# Patient Record
Sex: FEMALE | Race: WHITE | NOT HISPANIC OR LATINO | Employment: OTHER | URBAN - METROPOLITAN AREA
[De-identification: names, ages, dates, MRNs, and addresses within clinical notes are randomized per-mention and may not be internally consistent; named-entity substitution may affect disease eponyms.]

---

## 2017-01-21 ENCOUNTER — HOSPITAL ENCOUNTER (OUTPATIENT)
Dept: LAB | Facility: MEDICAL CENTER | Age: 72
End: 2017-01-21
Attending: INTERNAL MEDICINE
Payer: MEDICARE

## 2017-01-21 ENCOUNTER — HOSPITAL ENCOUNTER (OUTPATIENT)
Dept: LAB | Facility: MEDICAL CENTER | Age: 72
End: 2017-01-21
Attending: CLINICAL NURSE SPECIALIST
Payer: MEDICARE

## 2017-01-21 DIAGNOSIS — D05.91 CARCINOMA IN SITU OF RIGHT BREAST: ICD-10-CM

## 2017-01-21 LAB
25(OH)D3 SERPL-MCNC: 13 NG/ML (ref 30–100)
ALBUMIN SERPL BCP-MCNC: 4.3 G/DL (ref 3.2–4.9)
ALBUMIN/GLOB SERPL: 1.6 G/DL
ALP SERPL-CCNC: 56 U/L (ref 30–99)
ALT SERPL-CCNC: 14 U/L (ref 2–50)
ANION GAP SERPL CALC-SCNC: 11 MMOL/L (ref 0–11.9)
AST SERPL-CCNC: 17 U/L (ref 12–45)
BASOPHILS # BLD AUTO: 0.03 K/UL (ref 0–0.12)
BASOPHILS NFR BLD AUTO: 0.5 % (ref 0–1.8)
BILIRUB SERPL-MCNC: 0.5 MG/DL (ref 0.1–1.5)
BUN SERPL-MCNC: 27 MG/DL (ref 8–22)
CALCIUM SERPL-MCNC: 9.2 MG/DL (ref 8.5–10.5)
CHLORIDE SERPL-SCNC: 101 MMOL/L (ref 96–112)
CO2 SERPL-SCNC: 25 MMOL/L (ref 20–33)
CREAT SERPL-MCNC: 1.35 MG/DL (ref 0.5–1.4)
EOSINOPHIL # BLD: 0.07 K/UL (ref 0–0.51)
EOSINOPHIL NFR BLD AUTO: 1.2 % (ref 0–6.9)
ERYTHROCYTE [DISTWIDTH] IN BLOOD BY AUTOMATED COUNT: 42.5 FL (ref 35.9–50)
GLOBULIN SER CALC-MCNC: 2.7 G/DL (ref 1.9–3.5)
GLUCOSE SERPL-MCNC: 104 MG/DL (ref 65–99)
HCT VFR BLD AUTO: 40.7 % (ref 37–47)
HGB BLD-MCNC: 13.2 G/DL (ref 12–16)
IMM GRANULOCYTES # BLD AUTO: 0.01 K/UL (ref 0–0.11)
IMM GRANULOCYTES NFR BLD AUTO: 0.2 % (ref 0–0.9)
LYMPHOCYTES # BLD: 1.63 K/UL (ref 1–4.8)
LYMPHOCYTES NFR BLD AUTO: 26.8 % (ref 22–41)
MCH RBC QN AUTO: 29.3 PG (ref 27–33)
MCHC RBC AUTO-ENTMCNC: 32.4 G/DL (ref 33.6–35)
MCV RBC AUTO: 90.2 FL (ref 81.4–97.8)
MONOCYTES # BLD: 0.41 K/UL (ref 0–0.85)
MONOCYTES NFR BLD AUTO: 6.7 % (ref 0–13.4)
NEUTROPHILS # BLD: 3.93 K/UL (ref 2–7.15)
NEUTROPHILS NFR BLD AUTO: 64.6 % (ref 44–72)
NRBC # BLD AUTO: 0 K/UL
NRBC BLD-RTO: 0 /100 WBC
PLATELET # BLD AUTO: 205 K/UL (ref 164–446)
PMV BLD AUTO: 11.2 FL (ref 9–12.9)
POTASSIUM SERPL-SCNC: 4.1 MMOL/L (ref 3.6–5.5)
PROT SERPL-MCNC: 7 G/DL (ref 6–8.2)
RBC # BLD AUTO: 4.51 M/UL (ref 4.2–5.4)
SODIUM SERPL-SCNC: 137 MMOL/L (ref 135–145)
WBC # BLD AUTO: 6.1 K/UL (ref 4.8–10.8)

## 2017-01-21 PROCEDURE — 80053 COMPREHEN METABOLIC PANEL: CPT

## 2017-01-21 PROCEDURE — 85025 COMPLETE CBC W/AUTO DIFF WBC: CPT

## 2017-01-21 PROCEDURE — 36415 COLL VENOUS BLD VENIPUNCTURE: CPT

## 2017-01-25 ENCOUNTER — OFFICE VISIT (OUTPATIENT)
Dept: HEMATOLOGY ONCOLOGY | Facility: MEDICAL CENTER | Age: 72
End: 2017-01-25
Payer: MEDICARE

## 2017-01-25 VITALS
HEIGHT: 65 IN | TEMPERATURE: 97.6 F | SYSTOLIC BLOOD PRESSURE: 110 MMHG | DIASTOLIC BLOOD PRESSURE: 64 MMHG | RESPIRATION RATE: 12 BRPM | HEART RATE: 66 BPM | BODY MASS INDEX: 30.63 KG/M2 | OXYGEN SATURATION: 96 % | WEIGHT: 183.86 LBS

## 2017-01-25 DIAGNOSIS — D05.91 CARCINOMA IN SITU OF RIGHT BREAST: ICD-10-CM

## 2017-01-25 PROCEDURE — 99214 OFFICE O/P EST MOD 30 MIN: CPT | Performed by: INTERNAL MEDICINE

## 2017-01-25 PROCEDURE — 4040F PNEUMOC VAC/ADMIN/RCVD: CPT | Mod: 8P | Performed by: INTERNAL MEDICINE

## 2017-01-25 PROCEDURE — G8484 FLU IMMUNIZE NO ADMIN: HCPCS | Performed by: INTERNAL MEDICINE

## 2017-01-25 PROCEDURE — 3017F COLORECTAL CA SCREEN DOC REV: CPT | Mod: 8P | Performed by: INTERNAL MEDICINE

## 2017-01-25 PROCEDURE — G8419 CALC BMI OUT NRM PARAM NOF/U: HCPCS | Performed by: INTERNAL MEDICINE

## 2017-01-25 PROCEDURE — G8432 DEP SCR NOT DOC, RNG: HCPCS | Performed by: INTERNAL MEDICINE

## 2017-01-25 PROCEDURE — 1036F TOBACCO NON-USER: CPT | Performed by: INTERNAL MEDICINE

## 2017-01-25 PROCEDURE — 3014F SCREEN MAMMO DOC REV: CPT | Performed by: INTERNAL MEDICINE

## 2017-01-25 PROCEDURE — 1101F PT FALLS ASSESS-DOCD LE1/YR: CPT | Mod: 8P | Performed by: INTERNAL MEDICINE

## 2017-01-25 NOTE — Clinical Note
[facility]    [address], [address]  [place] [address]        Date:1/25/2017                     Reference to Arlette Brice    Follow up Note: Oncology    Date:1/25/17  Time: 10 am     Primary care physician:  Dr. Po Hwang:  Dr. Gil  Radiation oncology: Dr. 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     History of presenting illness:  Mrs. Brice is a 71-year-old female diagnosed in 10/2013 with right breast DCIS. She was diagnosed secondary to abnormal screening mammogram which showed calcifications. Biopsy was positive for DCIS. She underwent wire localization and sentinel node biopsy. Final pathology was consistent with intermediate grade DCIS with focal high-grade areas measuring 2-2 0.4 cm in size with the superior margin of less than 2 mm and an anterior margin of 1 mm. She also had a sentinel node biopsy which showed 0/3 lymph nodes. She was staged as pTis pN0, ER 90% and [place] 90%.  He is S/P breast radiation. She was started on tamoxifen in 12/2013 and has been tolerating it well.    Interval History:   She is here for follow up visit.  He has been feeling fairly well since her last visit. She has been compliant with tamoxifen with no new issues. She does have occasional hot flashes which haven't significantly improved since her last visit. She has stable energy, appetite and weight. She denies any fevers, chills or night sweats.    Past Medical History:  1. DCIS   2. HTN  3. HLP   4. Systolic heart failure  5. Vertigo  6. Left shoulder injury     Allergies:  Review of patient's allergies indicates no known allergies.      Medications:  Current Outpatient Prescriptions on File Prior to Visit   Medication Sig Dispense Refill   • tamoxifen (NOLVADEX) 20 MG tablet Take 1 Tab by mouth every day. 90 Tab 3   • tamoxifen (NOLVADEX) 20 MG tablet Take 1 Tab by mouth 2 times a  "day. 90 Each 11   • meclizine (ANTIVERT) 25 MG TABS Take 25 mg by mouth as needed.     • hydrochlorothiazide (HYDRODIURIL) 25 MG TABS Take 1 Tab by mouth every day. 30 Tab 0   • lisinopril (PRINIVIL, ZESTRIL) 40 MG tablet Take 1 Tab by mouth every day. 30 Tab 0   • metoprolol SR (TOPROL XL) 50 MG TB24 Take 1 Tab by mouth 2 Times a Day. 30 Tab 0   • simvastatin (ZOCOR) 5 MG TABS Take 1 Tab by mouth every evening. 30 Tab 0   • Ergocalciferol (VITAMIN D2 PO) Take  by mouth.     • amlodipine (NORVASC) 10 MG TABS Take 1 Tab by mouth every day. 30 Tab 0     No current facility-administered medications on file prior to visit.       Review of Systems:  All other review of systems are negative except what was mentioned above in the HPI.  Constitutional: Negative for fever, chills, weight loss and malaise/fatigue.    HENT: Negative for ear pain and nosebleeds.     Eyes: Negative for blurred vision.    Respiratory: Negative for cough and shortness of breath.    Cardiovascular: Negative for chest pain and leg swelling.    Gastrointestinal: Negative for nausea, vomiting and abdominal pain.    Genitourinary: Negative for dysuria.    Musculoskeletal: Negative for myalgias, back pain and joint pain.   Skin: Negative for rash.    Neurological: Negative for dizziness, sensory change, focal weakness and headaches.   Endo/Heme/Allergies: No bruise/bleed easily.    Psychiatric/Behavioral: Negative for depression and memory loss.      Physical Exam:  Vitals:     Filed Vitals:    01/25/17 0948   BP: 110/64   Pulse: 66   Temp: 36.4 °C (97.6 °F)   Resp: 12   Height: 1.651 m (5' 5\")   Weight: 83.4 kg (183 lb 13.8 oz)   SpO2: 96%         General: Not in acute distress, alert and oriented x 3  HEENT: normalcephalic, atraumatic, extra ocular muscles intact, moist mucus membranes, and oral cavity without any lesions.  Neck: Supple neck and full range of motion  Lymph nodes: No palpable bilateral cervical, supraclavicular and axillary " lymphadenopathy.    CVS: regular rate and rhythm  RESP: Clear to auscultate bilaterally.   Breast:   Right breast: Postsurgical changes. No concerning palpable masses in the breast or axilla.  Left breast: No palpable masses in the breast or axilla. .  ABD: Soft, non tender, non distended, and positive bowel sounds  EXT: No edema or cyanosis  CNS: Alert and oriented x3, cranial nerves grossly intact, and muscle strength grossly intact.     Labs:  Hospital Outpatient Visit on 01/21/2017   Component Date Value Ref Range Status   • 25-Hydroxy   Vitamin D 25 01/21/2017 13* 30 - 100 ng/mL Final    Comment: Adult Ranges:   <20 ng/mL - Deficiency  20-29 ng/mL - Insufficiency   ng/mL - Sufficiency  The Advia Centaur Vitamin D Assay is standardized to the  [facility] reference measurement procedures, a  reference method for the Vitamin D Standardization Program  (VDSP).  The VDSP aligns patient results among 25 (OH)  Vitamin D methods.     Hospital Outpatient Visit on 01/21/2017   Component Date Value Ref Range Status   • Sodium 01/21/2017 137  135 - 145 mmol/L Final   • Potassium 01/21/2017 4.1  3.6 - 5.5 mmol/L Final   • Chloride 01/21/2017 101  96 - 112 mmol/L Final   • Co2 01/21/2017 25  20 - 33 mmol/L Final   • Anion Gap 01/21/2017 11.0  0.0 - 11.9 Final   • Glucose 01/21/2017 104* 65 - 99 mg/dL Final   • Bun 01/21/2017 27* 8 - 22 mg/dL Final   • Creatinine 01/21/2017 1.35  0.50 - 1.40 mg/dL Final   • Calcium 01/21/2017 9.2  8.5 - 10.5 mg/dL Final   • AST(SGOT) 01/21/2017 17  12 - 45 U/L Final   • ALT(SGPT) 01/21/2017 14  2 - 50 U/L Final   • Alkaline Phosphatase 01/21/2017 56  30 - 99 U/L Final   • Total Bilirubin 01/21/2017 0.5  0.1 - 1.5 mg/dL Final   • Albumin 01/21/2017 4.3  3.2 - 4.9 g/dL Final   • Total Protein 01/21/2017 7.0  6.0 - 8.2 g/dL Final   • Globulin 01/21/2017 2.7  1.9 - 3.5 g/dL Final   • A-G Ratio 01/21/2017 1.6   Final   • WBC 01/21/2017 6.1  4.8 - 10.8 K/uL Final   • RBC 01/21/2017 4.51   4.20 - 5.40 M/uL Final   • Hemoglobin 2017 13.2  12.0 - 16.0 g/dL Final   • Hematocrit 2017 40.7  37.0 - 47.0 % Final   • MCV 2017 90.2  81.4 - 97.8 fL Final   • MCH 2017 29.3  27.0 - 33.0 pg Final   • MCHC 2017 32.4* 33.6 - 35.0 g/dL Final   • RDW 2017 42.5  35.9 - 50.0 fL Final   • Platelet Count 2017 205  164 - 446 K/uL Final   • MPV 2017 11.2  9.0 - 12.9 fL Final   • Neutrophils-Polys 2017 64.60  44.00 - 72.00 % Final   • Lymphocytes 2017 26.80  22.00 - 41.00 % Final   • Monocytes 2017 6.70  0.00 - 13.40 % Final   • Eosinophils 2017 1.20  0.00 - 6.90 % Final   • Basophils 2017 0.50  0.00 - 1.80 % Final   • Immature Granulocytes 2017 0.20  0.00 - 0.90 % Final   • Nucleated RBC 2017 0.00   Final   • Neutrophils (Absolute) 2017 3.93  2.00 - 7.15 K/uL Final    Includes immature neutrophils, if present.   • Lymphs (Absolute) 2017 1.63  1.00 - 4.80 K/uL Final   • Monos (Absolute) 2017 0.41  0.00 - 0.85 K/uL Final   • Eos (Absolute) 2017 0.07  0.00 - 0.51 K/uL Final   • Baso (Absolute) 2017 0.03  0.00 - 0.12 K/uL Final   • Immature Granulocytes (abs) 2017 0.01  0.00 - 0.11 K/uL Final   • NRBC (Absolute) 2017 0.00   Final   • GFR If  2017 46* >60 mL/min/1.73 m 2 Final   • GFR If Non  2017 38* >60 mL/min/1.73 m 2 Final   ]    Imagin16 Mammogram: No interval change and no new suspicious findings appreciated in either breast. Annual follow-up recommended. These results were given to the patient at the time of visit.    Assessment and Plan:    1. DCIS, grade 2 with foci of grade 3, 2-2.5 cm, ER 90% and [place] 90%: She underwent lumpectomy and sentinel node biopsy. She was found to have DCIS. He had a close margin but reexcision was deferred by surgery. She underwent breast radiation. She has been on tamoxifen and has been tolerating it well.  Her most recent mammogram showed no concerning changes. Her next mammogram is due 12/2017. She is continued on single agent tamoxifen.    2. Hypertriglyceridemia: Last lipid panel showed improvement. She continues to follow up with PCP. Recommend periodic lipid panels specialist and she is on tamoxifen.    3. Chronic kidney disease: Stable.    4. Vitamin D deficiency: Patient is due to follow up with her primary care physician shortly. Recommended vitamin D replacement.    5.  She is to follow-up again in 6 months or sooner as needed.     She agreed and verbalized her agreement and understanding with the current plan.  I answered all questions and concerns she has at this time and advised her to call at any time in the interim with questions or concerns in regards to her care.  Thank you for allowing me to participate in her care, I will continue to follow.    Please note that this dictation was created using voice recognition software. I have made every reasonable attempt to correct obvious errors, but I expect that there are errors of grammar and possibly content that I did not discover before finalizing the note.      Sincerely,  Lizbeth Christian  [address] [place] [address], [address]   [phone]

## 2017-01-25 NOTE — MR AVS SNAPSHOT
"Arlette Brice   2017 10:00 AM   Office Visit   MRN: [id number]    Department:  [facility]   Dept Phone:  [phone]    Description:  Female : 1945   Provider:  Lizbeth Christian M.D.           Reason for Visit     Follow-Up 6 MONTH      Allergies as of 2017     No Known Allergies      Vital Signs     Blood Pressure Pulse Temperature Respirations Height Weight    110/64 mmHg 66 36.4 °C (97.6 °F) 12 1.651 m (5' 5\") 83.4 kg (183 lb 13.8 oz)    Body Mass Index Oxygen Saturation Smoking Status             30.60 kg/m2 96% Never Smoker          Basic Information     Date Of Birth Sex Race Ethnicity Preferred Language    1945 Female White Non- English      Your appointments     2017 10:00 AM   ONCOLOGY EST PATIENT 30 MIN with Lizbeth Christian M.D.   [facility] (--)    [address] [place] [address], [address]  [facility] [id number] [phone]              Problem List              ICD-10-CM Priority Class Noted - Resolved    Dizziness R42   2013 - Present    Hypertensive urgency I16.0   2013 - Present    Hypokalemia E87.6   2013 - Present    Dyslipidemia E78.5   2013 - Present    Cardiac murmur, previously undiagnosed R01.1   2013 - Present    Lower urinary tract infectious disease N39.0   2013 - Present    Abnormal nuclear stress test R94.39   2013 - Present    Carcinoma in situ of breast D05.90   2013 - Present      Health Maintenance        Date Due Completion Dates    IMM DTaP/Tdap/Td Vaccine (1 - Tdap) 1962 ---    PAP SMEAR 1964 ---    COLONOSCOPY 1993 ---    IMM ZOSTER VACCINE 2003 ---    BONE DENSITY 2008 ---    IMM PNEUMOCOCCAL 65+ (ADULT) LOW/MEDIUM RISK SERIES (1 of 2 - PCV13) 2008 ---    IMM INFLUENZA (1) 2016 ---    MAMMOGRAM 2017, 2015, 2014, 2013, 2013, 2013, 2013, 2013, 2013, 2013, 2013            Current " Immunizations     No immunizations on file.      Below and/or attached are the medications your provider expects you to take. Review all of your home medications and newly ordered medications with your provider and/or pharmacist. Follow medication instructions as directed by your provider and/or pharmacist. Please keep your medication list with you and share with your provider. Update the information when medications are discontinued, doses are changed, or new medications (including over-the-counter products) are added; and carry medication information at all times in the event of emergency situations     Allergies:  No Known Allergies          Medications  Valid as of: January 25, 2017 - 10:06 AM    Generic Name Brand Name Tablet Size Instructions for use    AmLODIPine Besylate (Tab) NORVASC 10 MG Take 1 Tab by mouth every day.        Ergocalciferol   Take  by mouth.        HydroCHLOROthiazide (Tab) HYDRODIURIL 25 MG Take 1 Tab by mouth every day.        Lisinopril (Tab) PRINIVIL, ZESTRIL 40 MG Take 1 Tab by mouth every day.        Meclizine HCl (Tab) ANTIVERT 25 MG Take 25 mg by mouth as needed.        Metoprolol Succinate (TABLET SR 24 HR) TOPROL XL 50 MG Take 1 Tab by mouth 2 Times a Day.        Simvastatin (Tab) ZOCOR 5 MG Take 1 Tab by mouth every evening.        Tamoxifen Citrate (Tab) NOLVADEX 20 MG Take 1 Tab by mouth every day.        .                 Medicines prescribed today were sent to:     None      Medication refill instructions:       If your prescription bottle indicates you have medication refills left, it is not necessary to call your provider’s office. Please contact your pharmacy and they will refill your medication.    If your prescription bottle indicates you do not have any refills left, you may request refills at any time through one of the following ways: The online [employer] system (except Urgent Care), by calling your provider’s office, or by asking your pharmacy to contact your  provider’s office with a refill request. Medication refills are processed only during regular business hours and may not be available until the next business day. Your provider may request additional information or to have a follow-up visit with you prior to refilling your medication.   *Please Note: Medication refills are assigned a new Rx number when refilled electronically. Your pharmacy may indicate that no refills were authorized even though a new prescription for the same medication is available at the pharmacy. Please request the medicine by name with the pharmacy before contacting your provider for a refill.           MyChart Status: Patient Declined

## 2017-01-25 NOTE — PROGRESS NOTES
Follow up Note: Oncology    Date:1/25/17  Time: 10 am     Primary care physician:  Dr. Po Hwang:  Dr. Gil  Radiation oncology: Dr. 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     History of presenting illness:  Mrs. Brice is a 71-year-old female diagnosed in 10/2013 with right breast DCIS. She was diagnosed secondary to abnormal screening mammogram which showed calcifications. Biopsy was positive for DCIS. She underwent wire localization and sentinel node biopsy. Final pathology was consistent with intermediate grade DCIS with focal high-grade areas measuring 2-2 0.4 cm in size with the superior margin of less than 2 mm and an anterior margin of 1 mm. She also had a sentinel node biopsy which showed 0/3 lymph nodes. She was staged as pTis pN0, ER 90% and [place] 90%.  He is S/P breast radiation. She was started on tamoxifen in 12/2013 and has been tolerating it well.    Interval History:   She is here for follow up visit.  He has been feeling fairly well since her last visit. She has been compliant with tamoxifen with no new issues. She does have occasional hot flashes which haven't significantly improved since her last visit. She has stable energy, appetite and weight. She denies any fevers, chills or night sweats.    Past Medical History:  1. DCIS   2. HTN  3. HLP   4. Systolic heart failure  5. Vertigo  6. Left shoulder injury     Allergies:  Review of patient's allergies indicates no known allergies.      Medications:  Current Outpatient Prescriptions on File Prior to Visit   Medication Sig Dispense Refill   • tamoxifen (NOLVADEX) 20 MG tablet Take 1 Tab by mouth every day. 90 Tab 3   • tamoxifen (NOLVADEX) 20 MG tablet Take 1 Tab by mouth 2 times a day. 90 Each 11   • meclizine (ANTIVERT) 25 MG TABS Take 25 mg by mouth as needed.     • hydrochlorothiazide (HYDRODIURIL) 25 MG TABS Take 1 Tab by mouth every  "day. 30 Tab 0   • lisinopril (PRINIVIL, ZESTRIL) 40 MG tablet Take 1 Tab by mouth every day. 30 Tab 0   • metoprolol SR (TOPROL XL) 50 MG TB24 Take 1 Tab by mouth 2 Times a Day. 30 Tab 0   • simvastatin (ZOCOR) 5 MG TABS Take 1 Tab by mouth every evening. 30 Tab 0   • Ergocalciferol (VITAMIN D2 PO) Take  by mouth.     • amlodipine (NORVASC) 10 MG TABS Take 1 Tab by mouth every day. 30 Tab 0     No current facility-administered medications on file prior to visit.       Review of Systems:  All other review of systems are negative except what was mentioned above in the HPI.  Constitutional: Negative for fever, chills, weight loss and malaise/fatigue.    HENT: Negative for ear pain and nosebleeds.     Eyes: Negative for blurred vision.    Respiratory: Negative for cough and shortness of breath.    Cardiovascular: Negative for chest pain and leg swelling.    Gastrointestinal: Negative for nausea, vomiting and abdominal pain.    Genitourinary: Negative for dysuria.    Musculoskeletal: Negative for myalgias, back pain and joint pain.   Skin: Negative for rash.    Neurological: Negative for dizziness, sensory change, focal weakness and headaches.   Endo/Heme/Allergies: No bruise/bleed easily.    Psychiatric/Behavioral: Negative for depression and memory loss.      Physical Exam:  Vitals:     Filed Vitals:    01/25/17 0948   BP: 110/64   Pulse: 66   Temp: 36.4 °C (97.6 °F)   Resp: 12   Height: 1.651 m (5' 5\")   Weight: 83.4 kg (183 lb 13.8 oz)   SpO2: 96%         General: Not in acute distress, alert and oriented x 3  HEENT: normalcephalic, atraumatic, extra ocular muscles intact, moist mucus membranes, and oral cavity without any lesions.  Neck: Supple neck and full range of motion  Lymph nodes: No palpable bilateral cervical, supraclavicular and axillary lymphadenopathy.    CVS: regular rate and rhythm  RESP: Clear to auscultate bilaterally.   Breast:   Right breast: Postsurgical changes. No concerning palpable masses in " the breast or axilla.  Left breast: No palpable masses in the breast or axilla. .  ABD: Soft, non tender, non distended, and positive bowel sounds  EXT: No edema or cyanosis  CNS: Alert and oriented x3, cranial nerves grossly intact, and muscle strength grossly intact.     Labs:  Hospital Outpatient Visit on 01/21/2017   Component Date Value Ref Range Status   • 25-Hydroxy   Vitamin D 25 01/21/2017 13* 30 - 100 ng/mL Final    Comment: Adult Ranges:   <20 ng/mL - Deficiency  20-29 ng/mL - Insufficiency   ng/mL - Sufficiency  The [employer] Centaur Vitamin D Assay is standardized to the  [facility] reference measurement procedures, a  reference method for the Vitamin D Standardization Program  (VDSP).  The VDSP aligns patient results among 25 (OH)  Vitamin D methods.     Hospital Outpatient Visit on 01/21/2017   Component Date Value Ref Range Status   • Sodium 01/21/2017 137  135 - 145 mmol/L Final   • Potassium 01/21/2017 4.1  3.6 - 5.5 mmol/L Final   • Chloride 01/21/2017 101  96 - 112 mmol/L Final   • Co2 01/21/2017 25  20 - 33 mmol/L Final   • Anion Gap 01/21/2017 11.0  0.0 - 11.9 Final   • Glucose 01/21/2017 104* 65 - 99 mg/dL Final   • Bun 01/21/2017 27* 8 - 22 mg/dL Final   • Creatinine 01/21/2017 1.35  0.50 - 1.40 mg/dL Final   • Calcium 01/21/2017 9.2  8.5 - 10.5 mg/dL Final   • AST(SGOT) 01/21/2017 17  12 - 45 U/L Final   • ALT(SGPT) 01/21/2017 14  2 - 50 U/L Final   • Alkaline Phosphatase 01/21/2017 56  30 - 99 U/L Final   • Total Bilirubin 01/21/2017 0.5  0.1 - 1.5 mg/dL Final   • Albumin 01/21/2017 4.3  3.2 - 4.9 g/dL Final   • Total Protein 01/21/2017 7.0  6.0 - 8.2 g/dL Final   • Globulin 01/21/2017 2.7  1.9 - 3.5 g/dL Final   • A-G Ratio 01/21/2017 1.6   Final   • WBC 01/21/2017 6.1  4.8 - 10.8 K/uL Final   • RBC 01/21/2017 4.51  4.20 - 5.40 M/uL Final   • Hemoglobin 01/21/2017 13.2  12.0 - 16.0 g/dL Final   • Hematocrit 01/21/2017 40.7  37.0 - 47.0 % Final   • MCV 01/21/2017 90.2  81.4 - 97.8  fL Final   • MCH 2017 29.3  27.0 - 33.0 pg Final   • MCHC 2017 32.4* 33.6 - 35.0 g/dL Final   • RDW 2017 42.5  35.9 - 50.0 fL Final   • Platelet Count 2017 205  164 - 446 K/uL Final   • MPV 2017 11.2  9.0 - 12.9 fL Final   • Neutrophils-Polys 2017 64.60  44.00 - 72.00 % Final   • Lymphocytes 2017 26.80  22.00 - 41.00 % Final   • Monocytes 2017 6.70  0.00 - 13.40 % Final   • Eosinophils 2017 1.20  0.00 - 6.90 % Final   • Basophils 2017 0.50  0.00 - 1.80 % Final   • Immature Granulocytes 2017 0.20  0.00 - 0.90 % Final   • Nucleated RBC 2017 0.00   Final   • Neutrophils (Absolute) 2017 3.93  2.00 - 7.15 K/uL Final    Includes immature neutrophils, if present.   • Lymphs (Absolute) 2017 1.63  1.00 - 4.80 K/uL Final   • Monos (Absolute) 2017 0.41  0.00 - 0.85 K/uL Final   • Eos (Absolute) 2017 0.07  0.00 - 0.51 K/uL Final   • Baso (Absolute) 2017 0.03  0.00 - 0.12 K/uL Final   • Immature Granulocytes (abs) 2017 0.01  0.00 - 0.11 K/uL Final   • NRBC (Absolute) 2017 0.00   Final   • GFR If  2017 46* >60 mL/min/1.73 m 2 Final   • GFR If Non  2017 38* >60 mL/min/1.73 m 2 Final   ]    Imagin16 Mammogram: No interval change and no new suspicious findings appreciated in either breast. Annual follow-up recommended. These results were given to the patient at the time of visit.    Assessment and Plan:    1. 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 She was found to have DCIS. He had a close margin but reexcision was deferred by surgery. She underwent breast radiation. She has been on tamoxifen and has been tolerating it well. Her most recent mammogram showed no concerning changes. Her next mammogram is due 2017. She is continued on single agent tamoxifen.    2. Hypertriglyceridemia: Last  lipid panel showed improvement. She continues to follow up with PCP. Recommend periodic lipid panels specialist and she is on tamoxifen.    3. Chronic kidney disease: Stable.    4. Vitamin D deficiency: Patient is due to follow up with her primary care physician shortly. Recommended vitamin D replacement.    5.  She is to follow-up again in 6 months or sooner as needed.     She agreed and verbalized her agreement and understanding with the current plan.  I answered all questions and concerns she has at this time and advised her to call at any time in the interim with questions or concerns in regards to her care.  Thank you for allowing me to participate in her care, I will continue to follow.    Please note that this dictation was created using voice recognition software. I have made every reasonable attempt to correct obvious errors, but I expect that there are errors of grammar and possibly content that I did not discover before finalizing the note.

## 2017-08-01 ENCOUNTER — OFFICE VISIT (OUTPATIENT)
Dept: HEMATOLOGY ONCOLOGY | Facility: MEDICAL CENTER | Age: 72
End: 2017-08-01
Payer: MEDICARE

## 2017-08-01 VITALS
SYSTOLIC BLOOD PRESSURE: 124 MMHG | RESPIRATION RATE: 16 BRPM | WEIGHT: 184.19 LBS | HEART RATE: 79 BPM | TEMPERATURE: 98.2 F | BODY MASS INDEX: 30.69 KG/M2 | HEIGHT: 65 IN | DIASTOLIC BLOOD PRESSURE: 84 MMHG | OXYGEN SATURATION: 95 %

## 2017-08-01 DIAGNOSIS — D05.11 DUCTAL CARCINOMA IN SITU (DCIS) OF RIGHT BREAST: ICD-10-CM

## 2017-08-01 PROCEDURE — 99214 OFFICE O/P EST MOD 30 MIN: CPT | Performed by: INTERNAL MEDICINE

## 2017-08-01 RX ORDER — METOPROLOL TARTRATE 50 MG/1
TABLET, FILM COATED ORAL
Refills: 5 | COMMUNITY
Start: 2017-07-17

## 2017-08-01 RX ORDER — PRAVASTATIN SODIUM 10 MG
TABLET ORAL
Refills: 2 | COMMUNITY
Start: 2017-06-15

## 2017-08-01 ASSESSMENT — PAIN SCALES - GENERAL: PAINLEVEL: NO PAIN

## 2017-08-01 NOTE — PROGRESS NOTES
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  -s/p  wire localization and sentinel node biopsy.     Final pathology -intermediate grade DCIS with focal high-grade areas measuring 2-2 0.4 cm in size with the superior margin of less than 2 mm and an anterior margin of 1 mm. She also had a sentinel node biopsy which showed 0/3 lymph nodes. - pTis pN0, ER 90% and AL 90%.       S/P breast radiation.   - started on tamoxifen in 12/2013 and has been tolerating it well.    She is here for a 6 month follow-up. She is doing well with no acute complaints. She has some occasional mild hot flashes    Past Medical History:      Past Medical History   Diagnosis Date   • Hypertension    • Cancer (CMS-HCC)      Breast   • Pain      2/10   • Urinary bladder disorder      UTI-hx   • Breast cancer (CMS-HCC)        Past surgical history:       Past Surgical History   Procedure Laterality Date   • Other abdominal surgery  02/2008     appendectomy   • Lumpectomy  12/4/2013     Performed by Gilma Gil M.D. at SURGERY SAME DAY [facility] ORS   • Node biopsy sentinel  12/4/2013     Performed by Gilma Gil M.D. at SURGERY SAME DAY [facility] ORS   • Pr radiation therapy plan simple         Allergies:       Review of patient's allergies indicates no known allergies.    Medications:         Current Outpatient Prescriptions   Medication Sig Dispense Refill   • metoprolol (LOPRESSOR) 50 MG Tab TAKE ONE TABLET BY MOUTH TWO TIMES A DAY FOR BLOOD PRESSURE. BRING ALL MEDICATIONS TO EVERY APPOINTMENT  5   • pravastatin (PRAVACHOL) 10 MG Tab   2   • tamoxifen (NOLVADEX) 20 MG tablet Take 1 Tab by mouth every day. 90 Tab 3   • Ergocalciferol (VITAMIN D2 PO) Take  by mouth.     • hydrochlorothiazide  (HYDRODIURIL) 25 MG TABS Take 1 Tab by mouth every day. 30 Tab 0   • lisinopril (PRINIVIL, ZESTRIL) 40 MG tablet Take 1 Tab by mouth every day. 30 Tab 0   • metoprolol SR (TOPROL XL) 50 MG TB24 Take 1 Tab by mouth 2 Times a Day. 30 Tab 0   • simvastatin (ZOCOR) 5 MG TABS Take 1 Tab by mouth every evening. 30 Tab 0   • meclizine (ANTIVERT) 25 MG TABS Take 25 mg by mouth as needed.     • amlodipine (NORVASC) 10 MG TABS Take 1 Tab by mouth every day. 30 Tab 0     No current facility-administered medications for this visit.         Social History:     Social History     Social History   • Marital Status:      Spouse Name: N/A   • Number of Children: N/A   • Years of Education: N/A     Occupational History   • Not on file.     Social History Main Topics   • Smoking status: Never Smoker    • Smokeless tobacco: Never Used   • Alcohol Use: Yes      Comment: 4 PER WK   • Drug Use: No   • Sexual Activity: Not on file     Other Topics Concern   • Not on file     Social History Narrative       Family History:    No family history on file.    Review of Systems:  All other review of systems are negative except what was mentioned above in the HPI.    Constitutional: Negative for fever, chills, weight loss and malaise/fatigue.    HEENT: No new auditory or visual complaints. No sore throat and neck pain.     Respiratory: Negative for cough, sputum production, shortness of breath and wheezing.    Cardiovascular: Negative for chest pain, palpitations, orthopnea and leg swelling.    Gastrointestinal: Negative for heartburn, nausea, vomiting and abdominal pain.    Genitourinary: Negative for dysuria, hematuria    Musculoskeletal: No new arthralgias or myalgias   Skin: Negative for rash and itching.    Neurological: Negative for focal weakness and headaches.    Endo/Heme/Allergies: No abnormal bleed/bruise.    Psychiatric/Behavioral: No new depression/anxiety.    Physical Exam:  Vitals: /84 mmHg  Pulse 79  Temp(Src) 36.8  "°C (98.2 °F)  Resp 16  Ht 1.651 m (5' 5\")  Wt 83.55 kg (184 lb 3.1 oz)  BMI 30.65 kg/m2  SpO2 95%    General: Not in acute distress, alert and oriented x 3  HEENT: No pallor, icterus. Oropharynx clear.   Neck: Supple, no palpable masses.  Lymph nodes: No palpable cervical, supraclavicular, axillary or inguinal lymphadenopathy.    CVS: regular rate and rhythm, no rubs or gallops  RESP: Clear to auscultate bilaterally, no wheezing or crackles.   ABD: Soft, non tender, non distended, positive bowel sounds, no palpable organomegaly  EXT: No edema or cyanosis  CNS: Alert and oriented x3, No focal deficits.  Skin- No rash  Right breast: Postsurgical changes. No concerning palpable masses in the breast or axilla.  Left breast: No palpable masses in the breast or axilla    Labs:   No visits with results within 1 Week(s) from this visit.  Latest known visit with results is:    Hospital Outpatient Visit on 01/21/2017   Component Date Value Ref Range Status   • 25-Hydroxy   Vitamin D 25 01/21/2017 13* 30 - 100 ng/mL Final    Comment: Adult Ranges:   <20 ng/mL - Deficiency  20-29 ng/mL - Insufficiency   ng/mL - Sufficiency  The Advia Centaur Vitamin D Assay is standardized to the  [employer] reference measurement procedures, a  reference method for the Vitamin D Standardization Program  (VDSP).  The VDSP aligns patient results among 25 (OH)  Vitamin D methods.               Assessment and Plan:  1. DCIS, grade 2 with foci of grade 3, 2-2.5 cm, ER 90% and MO 90%: She underwent lumpectomy and sentinel node biopsy. She was found to have DCIS. He had a close margin but reexcision was deferred by surgery. She underwent breast radiation. She has been on tamoxifen and has been tolerating it well. I have scheduled her next mammogram for December 2017. Return to clinic in 6 months. Continue yearly lipid panel testing and pelvic exam    80% of the 30 minute clinic visit spent reviewing her clinical data and answering " questions regarding her diagnosis and prognosis. Reassured her that she is on tamoxifen and is at risk reduction strategy against new primary and she can go off of it in December 2018.    She agreed and verbalized her agreement and understanding with the current plan.  I answered all questions and concerns she has at this time         Please note that this dictation was created using voice recognition software. I have made every reasonable attempt to correct obvious errors, but I expect that there are errors of grammar and possibly content that I did not discover before finalizing the note.      SIGNATURES:  Ike Mahan    CC:  Lauren Fontenot M.D.  No ref. provider found

## 2017-08-01 NOTE — MR AVS SNAPSHOT
"        Arlette Yuniel   2017 3:20 PM   Office Visit   MRN: [id number]    Department:  [facility]   Dept Phone:  [phone]    Description:  Female : 1945   Provider:  Ike Mahan M.D.           Reason for Visit     Follow-Up carcinoma in site of breast - 6month transfer from Dr. Christian      Allergies as of 2017     No Known Allergies      You were diagnosed with     Ductal carcinoma in situ (DCIS) of right breast   [[id number]]         Vital Signs     Blood Pressure Pulse Temperature Respirations Height Weight    124/84 mmHg 79 36.8 °C (98.2 °F) 16 1.651 m (5' 5\") 83.55 kg (184 lb 3.1 oz)    Body Mass Index Oxygen Saturation Smoking Status             30.65 kg/m2 95% Never Smoker          Basic Information     Date Of Birth Sex Race Ethnicity Preferred Language    1945 Female White Non- English      Your appointments     2018 11:20 AM   ONCOLOGY EST PATIENT 30 MIN with Ike Mahan M.D.   [facility] (--)    [address] [place] [address], [address]  [facility] [id number] [phone]              Problem List              ICD-10-CM Priority Class Noted - Resolved    Dizziness R42   2013 - Present    Hypertensive urgency I16.0   2013 - Present    Hypokalemia E87.6   2013 - Present    Dyslipidemia E78.5   2013 - Present    Cardiac murmur, previously undiagnosed R01.1   2013 - Present    Lower urinary tract infectious disease N39.0   2013 - Present    Abnormal nuclear stress test R94.39   2013 - Present    Carcinoma in situ of breast D05.90   2013 - Present      Health Maintenance        Date Due Completion Dates    IMM DTaP/Tdap/Td Vaccine (1 - Tdap) 1962 ---    PAP SMEAR 1964 ---    COLONOSCOPY 1993 ---    IMM ZOSTER VACCINE 2003 ---    BONE DENSITY 2008 ---    IMM PNEUMOCOCCAL 65+ (ADULT) LOW/MEDIUM RISK SERIES (1 of 2 - PCV13) 2008 ---    IMM INFLUENZA (1) 2017 ---    MAMMOGRAM " 12/30/2017 12/30/2016, 12/14/2015, 12/8/2014            Current Immunizations     No immunizations on file.      Below and/or attached are the medications your provider expects you to take. Review all of your home medications and newly ordered medications with your provider and/or pharmacist. Follow medication instructions as directed by your provider and/or pharmacist. Please keep your medication list with you and share with your provider. Update the information when medications are discontinued, doses are changed, or new medications (including over-the-counter products) are added; and carry medication information at all times in the event of emergency situations     Allergies:  No Known Allergies          Medications  Valid as of: August 01, 2017 -  3:40 PM    Generic Name Brand Name Tablet Size Instructions for use    AmLODIPine Besylate (Tab) NORVASC 10 MG Take 1 Tab by mouth every day.        Ergocalciferol   Take  by mouth.        HydroCHLOROthiazide (Tab) HYDRODIURIL 25 MG Take 1 Tab by mouth every day.        Lisinopril (Tab) PRINIVIL, ZESTRIL 40 MG Take 1 Tab by mouth every day.        Meclizine HCl (Tab) ANTIVERT 25 MG Take 25 mg by mouth as needed.        Metoprolol Succinate (TABLET SR 24 HR) TOPROL XL 50 MG Take 1 Tab by mouth 2 Times a Day.        Metoprolol Tartrate (Tab) LOPRESSOR 50 MG TAKE ONE TABLET BY MOUTH TWO TIMES A DAY FOR BLOOD PRESSURE. BRING ALL MEDICATIONS TO EVERY APPOINTMENT        Pravastatin Sodium (Tab) PRAVACHOL 10 MG         Simvastatin (Tab) ZOCOR 5 MG Take 1 Tab by mouth every evening.        Tamoxifen Citrate (Tab) NOLVADEX 20 MG Take 1 Tab by mouth every day.        .                 Medicines prescribed today were sent to:     None      Medication refill instructions:       If your prescription bottle indicates you have medication refills left, it is not necessary to call your provider’s office. Please contact your pharmacy and they will refill your medication.    If your  prescription bottle indicates you do not have any refills left, you may request refills at any time through one of the following ways: The online Amarin system (except Urgent Care), by calling your provider’s office, or by asking your pharmacy to contact your provider’s office with a refill request. Medication refills are processed only during regular business hours and may not be available until the next business day. Your provider may request additional information or to have a follow-up visit with you prior to refilling your medication.   *Please Note: Medication refills are assigned a new Rx number when refilled electronically. Your pharmacy may indicate that no refills were authorized even though a new prescription for the same medication is available at the pharmacy. Please request the medicine by name with the pharmacy before contacting your provider for a refill.        Your To Do List     Future Labs/Procedures Complete By Expires    [id number]  As directed 8/1/2018         Amarin Access Code: [email or web address]  Expires: 8/31/2017  3:40 PM    Amarin  A secure, online tool to manage your health information     [employer]’s Amarin® is a secure, online tool that connects you to your personalized health information from the privacy of your home -- day or night - making it very easy for you to manage your healthcare. Once the activation process is completed, you can even access your medical information using the Amarin ramon, which is available for free in the Apple Ramon store or Google Play store.     Amarin provides the following levels of access (as shown below):   My Chart Features   [facility] Doctor [facility]  Specialists [facility]  Urgent  Care Non-[facility]  Primary Care  Doctor   Email your healthcare team securely and privately 24/7 X X X    Manage appointments: schedule your next appointment; view details of past/upcoming appointments X      Request prescription refills. X      View  recent personal medical records, including lab and immunizations X X X X   View health record, including health history, allergies, medications X X X X   Read reports about your outpatient visits, procedures, consult and ER notes X X X X   See your discharge summary, which is a recap of your hospital and/or ER visit that includes your diagnosis, lab results, and care plan. X X       How to register for Limonetik:  1. Go to  [email or web address].  2. Click on the Sign Up Now box, which takes you to the New Member Sign Up page. You will need to provide the following information:  a. Enter your Limonetik Access Code exactly as it appears at the top of this page. (You will not need to use this code after you’ve completed the sign-up process. If you do not sign up before the expiration date, you must request a new code.)   b. Enter your date of birth.   c. Enter your home email address.   d. Click Submit, and follow the next screen’s instructions.  3. Create a Limonetik ID. This will be your Limonetik login ID and cannot be changed, so think of one that is secure and easy to remember.  4. Create a Limonetik password. You can change your password at any time.  5. Enter your Password Reset Question and Answer. This can be used at a later time if you forget your password.   6. Enter your e-mail address. This allows you to receive e-mail notifications when new information is available in Limonetik.  7. Click Sign Up. You can now view your health information.    For assistance activating your Limonetik account, call [phone]

## 2017-08-03 ENCOUNTER — TELEPHONE (OUTPATIENT)
Dept: HEMATOLOGY ONCOLOGY | Facility: MEDICAL CENTER | Age: 72
End: 2017-08-03

## 2017-08-03 NOTE — TELEPHONE ENCOUNTER
Talked to patient to inform her about her dora gram January 17, 2018 appointment time 10:45 address is [address] [place] [address]

## 2018-01-17 ENCOUNTER — HOSPITAL ENCOUNTER (OUTPATIENT)
Dept: RADIOLOGY | Facility: MEDICAL CENTER | Age: 73
End: 2018-01-17
Attending: INTERNAL MEDICINE
Payer: MEDICARE

## 2018-01-17 DIAGNOSIS — Z12.31 ENCOUNTER FOR SCREENING MAMMOGRAM FOR BREAST CANCER: ICD-10-CM

## 2018-01-17 PROCEDURE — 77067 SCR MAMMO BI INCL CAD: CPT

## 2018-02-21 ENCOUNTER — OFFICE VISIT (OUTPATIENT)
Dept: HEMATOLOGY ONCOLOGY | Facility: MEDICAL CENTER | Age: 73
End: 2018-02-21
Payer: MEDICARE

## 2018-02-21 VITALS
RESPIRATION RATE: 18 BRPM | DIASTOLIC BLOOD PRESSURE: 82 MMHG | HEART RATE: 144 BPM | TEMPERATURE: 97 F | SYSTOLIC BLOOD PRESSURE: 128 MMHG | OXYGEN SATURATION: 95 % | BODY MASS INDEX: 29.86 KG/M2 | HEIGHT: 65 IN | WEIGHT: 179.23 LBS

## 2018-02-21 DIAGNOSIS — D05.11 DUCTAL CARCINOMA IN SITU (DCIS) OF RIGHT BREAST: ICD-10-CM

## 2018-02-21 PROCEDURE — 99213 OFFICE O/P EST LOW 20 MIN: CPT | Performed by: INTERNAL MEDICINE

## 2018-02-21 RX ORDER — TAMOXIFEN CITRATE 20 MG/1
20 TABLET ORAL DAILY
Qty: 60 TAB | Refills: 3 | Status: SHIPPED | OUTPATIENT
Start: 2018-02-21

## 2018-02-21 ASSESSMENT — PAIN SCALES - GENERAL: PAINLEVEL: NO PAIN

## 2018-02-21 NOTE — PROGRESS NOTES
Date of visit: 2/21/2018  1:16 PM      Chief Complaint- DCIS, grade 2 with foci of grade 3, 2-2.5 cm, ER 90% and NM 90%, for follow-up visit         History of presenting illness:   10/2013- right breast DCIS.- abnormal screening mammogram -Biopsy was positive for DCIS.  -s/p  wire localization and sentinel node biopsy.      Final pathology -intermediate grade DCIS with focal high-grade areas measuring 2-2 0.4 cm in size with the superior margin of less than 2 mm and an anterior margin of 1 mm. She also had a sentinel node biopsy which showed 0/3 lymph nodes. - pTis pN0, ER 90% and NM 90%.        S/P breast radiation.   - started on tamoxifen in 12/2013 and has been tolerating it well.     She is here for a 6 month follow-up. She is doing well with no acute complaints..      Past Medical History:      Past Medical History:   Diagnosis Date   • Breast cancer (CMS-HCC)    • Cancer (CMS-HCC)     Breast   • Hypertension    • Pain     2/10   • Urinary bladder disorder     UTI-hx       Past surgical history:       Past Surgical History:   Procedure Laterality Date   • LUMPECTOMY  12/4/2013    Performed by Gilma Gil M.D. at SURGERY SAME DAY [facility] ORS   • NODE BIOPSY SENTINEL  12/4/2013    Performed by Gilma Gil M.D. at SURGERY SAME DAY [facility]   • OTHER ABDOMINAL SURGERY  02/2008    appendectomy   • PB RADIATION THERAPY PLAN SIMPLE         Allergies:       Patient has no known allergies.    Medications:         Current Outpatient Prescriptions   Medication Sig Dispense Refill   • tamoxifen (NOLVADEX) 20 MG tablet Take 1 Tab by mouth every day. 60 Tab 3   • hydrochlorothiazide (HYDRODIURIL) 25 MG TABS Take 1 Tab by mouth every day. 30 Tab 0   • lisinopril (PRINIVIL, ZESTRIL) 40 MG tablet Take 1 Tab by mouth every day. 30 Tab 0   • metoprolol SR (TOPROL XL) 50 MG TB24 Take 1 Tab by mouth 2 Times a Day. 30 Tab 0   • simvastatin (ZOCOR) 5 MG TABS Take 1 Tab by mouth every evening. 30 Tab 0   • metoprolol  (LOPRESSOR) 50 MG Tab TAKE ONE TABLET BY MOUTH TWO TIMES A DAY FOR BLOOD PRESSURE. BRING ALL MEDICATIONS TO EVERY APPOINTMENT  5   • pravastatin (PRAVACHOL) 10 MG Tab   2   • Ergocalciferol (VITAMIN D2 PO) Take  by mouth.     • meclizine (ANTIVERT) 25 MG TABS Take 25 mg by mouth as needed.     • amlodipine (NORVASC) 10 MG TABS Take 1 Tab by mouth every day. 30 Tab 0     No current facility-administered medications for this visit.          Social History:     Social History     Social History   • Marital status:      Spouse name: N/A   • Number of children: N/A   • Years of education: N/A     Occupational History   • Not on file.     Social History Main Topics   • Smoking status: Never Smoker   • Smokeless tobacco: Never Used   • Alcohol use Yes      Comment: 4 PER WK   • Drug use: No   • Sexual activity: Not on file     Other Topics Concern   • Not on file     Social History Narrative   • No narrative on file       Family History:    No family history on file.    Review of Systems:  All other review of systems are negative except what was mentioned above in the HPI.    Constitutional: Negative for fever, chills, weight loss and malaise/fatigue.    HEENT: No new auditory or visual complaints. No sore throat and neck pain.     Respiratory: Negative for cough, sputum production, shortness of breath and wheezing.    Cardiovascular: Negative for chest pain, palpitations, orthopnea and leg swelling.    Gastrointestinal: Negative for heartburn, nausea, vomiting and abdominal pain.    Genitourinary: Negative for dysuria, hematuria    Musculoskeletal: No new arthralgias or myalgias   Skin: Negative for rash and itching.    Neurological: Negative for focal weakness and headaches.    Endo/Heme/Allergies: No abnormal bleed/bruise.    Psychiatric/Behavioral: No new depression/anxiety.    Physical Exam:  Vitals: /82   Pulse (!) 144 Comment: Patient stated she was in a rush to get to appointment, went on the wrong  "elevator  Temp 36.1 °C (97 °F)   Resp 18   Ht 1.651 m (5' 5\")   Wt 81.3 kg (179 lb 3.7 oz)   SpO2 95%   Breastfeeding? No   BMI 29.83 kg/m²     General: Not in acute distress, alert and oriented x 3  HEENT: No pallor, icterus. Oropharynx clear.   Neck: Supple, no palpable masses.  Lymph nodes: No palpable cervical, supraclavicular, axillary or inguinal lymphadenopathy.    CVS: regular rate and rhythm, no rubs or gallops  RESP: Clear to auscultate bilaterally, no wheezing or crackles.   ABD: Soft, non tender, non distended, positive bowel sounds, no palpable organomegaly  EXT: No edema or cyanosis  CNS: Alert and oriented x3, No focal deficits.  Skin- No rash      Labs:   No visits with results within 1 Week(s) from this visit.   Latest known visit with results is:   Hospital Outpatient Visit on 01/21/2017   Component Date Value Ref Range Status   • 25-Hydroxy   Vitamin D 25 01/21/2017 13* 30 - 100 ng/mL Final    Comment: Adult Ranges:   <20 ng/mL - Deficiency  20-29 ng/mL - Insufficiency   ng/mL - Sufficiency  The Advia Centaur Vitamin D Assay is standardized to the  [facility] reference measurement procedures, a  reference method for the Vitamin D Standardization Program  (VDSP).  The VDSP aligns patient results among 25 (OH)  Vitamin D methods.       Mammogram -1/17/18-Breasts have scattered areas of fibroglandular density, with no radiographic evidence of malignancy and no interval change.        Assessment and Plan:  1. DCIS, grade 2 with foci of grade 3, 2-2.5 cm, ER 90% and MT 90%: She underwent lumpectomy and sentinel node biopsy. She was found to have DCIS. He had a close margin but reexcision was deferred by surgery. She underwent breast radiation. She has been on tamoxifen and has been tolerating it well. I have scheduled her next mammogram in 1 year. Return to clinic after for her last f/u visit.    She agreed and verbalized her agreement and understanding with the current plan.  I " answered all questions and concerns she has at this time         Please note that this dictation was created using voice recognition software. I have made every reasonable attempt to correct obvious errors, but I expect that there are errors of grammar and possibly content that I did not discover before finalizing the note.      SIGNATURES:  Ike Mahan    CC:  Lauren Fontenot M.D.  No ref. provider found

## 2019-02-11 ENCOUNTER — HOSPITAL ENCOUNTER (OUTPATIENT)
Dept: RADIOLOGY | Facility: MEDICAL CENTER | Age: 74
End: 2019-02-11
Attending: INTERNAL MEDICINE
Payer: MEDICARE

## 2019-02-11 DIAGNOSIS — Z12.39 BREAST SCREENING: ICD-10-CM

## 2019-02-11 PROCEDURE — 77063 BREAST TOMOSYNTHESIS BI: CPT

## 2019-02-21 ENCOUNTER — OFFICE VISIT (OUTPATIENT)
Dept: HEMATOLOGY ONCOLOGY | Facility: MEDICAL CENTER | Age: 74
End: 2019-02-21
Payer: MEDICARE

## 2019-02-21 VITALS
OXYGEN SATURATION: 96 % | HEART RATE: 89 BPM | HEIGHT: 64 IN | TEMPERATURE: 97.5 F | RESPIRATION RATE: 18 BRPM | WEIGHT: 170.86 LBS | BODY MASS INDEX: 29.17 KG/M2 | SYSTOLIC BLOOD PRESSURE: 106 MMHG | DIASTOLIC BLOOD PRESSURE: 70 MMHG

## 2019-02-21 DIAGNOSIS — D05.11 DUCTAL CARCINOMA IN SITU (DCIS) OF RIGHT BREAST: ICD-10-CM

## 2019-02-21 PROCEDURE — 99213 OFFICE O/P EST LOW 20 MIN: CPT | Performed by: INTERNAL MEDICINE

## 2019-02-21 ASSESSMENT — PAIN SCALES - GENERAL: PAINLEVEL: NO PAIN

## 2019-02-21 NOTE — PROGRESS NOTES
Date of visit: 2/21/2019  1:11 PM    Chief Complaint -  DCIS    Identification/Prior relevant history: Arlette Brice  is a 73 y.o. year old female who is here for follow-up of DCIS.    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  -s/p  wire localization and sentinel node biopsy.      Final pathology -intermediate grade DCIS with focal high-grade areas measuring 2-2 0.4 cm in size with the superior margin of less than 2 mm and an anterior margin of 1 mm. She also had a sentinel node biopsy which showed 0/3 lymph nodes. - pTis pN0, ER 90% and TN 90%.        S/P breast radiation.   - started on tamoxifen in 12/2013 and has been tolerating it well.    Interim history  Patient doing well, has stopped tamoxifen few months ago as she has completed 5 year tamoxifen therapy. She reports no new symptoms since stopping. Her mammogram was also normal.    Past Medical History:      Past Medical History:   Diagnosis Date   • Breast cancer (CMS-HCC) (HCC)    • Cancer (CMS-HCC) (HCC)     Breast   • Hypertension    • Pain     2/10   • Urinary bladder disorder     [id number]       Past surgical history:       Past Surgical History:   Procedure Laterality Date   • LUMPECTOMY  12/4/2013    Performed by Gilma Gil M.D. at SURGERY SAME DAY [facility]S   • NODE BIOPSY SENTINEL  12/4/2013    Performed by Gilma Gil M.D. at SURGERY SAME DAY [facility] OR[facility]   • OTHER ABDOMINAL SURGERY  02/2008    appendectomy   • PB RADIATION THERAPY PLAN SIMPLE         Allergies:       Patient has no known allergies.    Medications:         Current Outpatient Prescriptions   Medication Sig Dispense Refill   • pravastatin (PRAVACHOL) 10 MG Tab   2   • amlodipine (NORVASC) 10 MG TABS Take 1 Tab by mouth every day. 30 Tab 0   • hydrochlorothiazide (HYDRODIURIL) 25 MG TABS Take 1 Tab by mouth every day. 30 Tab 0   • lisinopril (PRINIVIL, ZESTRIL)  40 MG tablet Take 1 Tab by mouth every day. 30 Tab 0   • metoprolol SR (TOPROL XL) 50 MG TB24 Take 1 Tab by mouth 2 Times a Day. 30 Tab 0   • tamoxifen (NOLVADEX) 20 MG tablet Take 1 Tab by mouth every day. (Patient not taking: Reported on 2/21/2019) 60 Tab 3   • metoprolol (LOPRESSOR) 50 MG Tab TAKE ONE TABLET BY MOUTH TWO TIMES A DAY FOR BLOOD PRESSURE. BRING ALL MEDICATIONS TO EVERY APPOINTMENT  5   • Ergocalciferol (VITAMIN D2 PO) Take  by mouth.     • meclizine (ANTIVERT) 25 MG TABS Take 25 mg by mouth as needed.     • simvastatin (ZOCOR) 5 MG TABS Take 1 Tab by mouth every evening. (Patient not taking: Reported on 2/21/2019) 30 Tab 0     No current facility-administered medications for this visit.          Social History:     Social History     Social History   • Marital status:      Spouse name: N/A   • Number of children: N/A   • Years of education: N/A     Occupational History   • Not on file.     Social History Main Topics   • Smoking status: Never Smoker   • Smokeless tobacco: Never Used   • Alcohol use Yes      Comment: 4 PER WK   • Drug use: No   • Sexual activity: Not on file     Other Topics Concern   • Not on file     Social History Narrative   • No narrative on file       Family History:    No family history on file.    Review of Systems:  All other review of systems are negative except what was mentioned above in the HPI.    Constitutional: Negative for fever, chills, weight loss and malaise/fatigue.    HEENT: No new auditory or visual complaints. No sore throat and neck pain.     Respiratory: Negative for cough, sputum production, shortness of breath and wheezing.    Cardiovascular: Negative for chest pain, palpitations, orthopnea and leg swelling.    Gastrointestinal: Negative for heartburn, nausea, vomiting and abdominal pain.    Genitourinary: Negative for dysuria, hematuria    Musculoskeletal: No new arthralgias or myalgias   Skin: Negative for rash and itching.    Neurological:  "Negative for focal weakness and headaches.    Endo/Heme/Allergies: No abnormal bleed/bruise.    Psychiatric/Behavioral: No new depression/anxiety.    Physical Exam:  Vitals: /70 (BP Location: Left arm, Patient Position: Sitting, BP Cuff Size: Adult)   Pulse 89   Temp 36.4 °C (97.5 °F) (Temporal)   Resp 18   Ht 1.62 m (5' 3.78\")   Wt 77.5 kg (170 lb 13.7 oz)   SpO2 96%   BMI 29.53 kg/m²     General: Not in acute distress, alert and oriented x 3  HEENT: No pallor, icterus. Oropharynx clear.   Neck: Supple, no palpable masses.  Lymph nodes: No palpable cervical, supraclavicular, axillary or inguinal lymphadenopathy.    CVS: regular rate and rhythm, no rubs or gallops  RESP: Clear to auscultate bilaterally, no wheezing or crackles.   ABD: Soft, non tender, non distended, positive bowel sounds, no palpable organomegaly  EXT: No edema or cyanosis  CNS: Alert and oriented x3, No focal deficits.  Skin- No rash      Labs:   No visits with results within 1 Week(s) from this visit.   Latest known visit with results is:   Hospital Outpatient Visit on 01/21/2017   Component Date Value Ref Range Status   • 25-Hydroxy   Vitamin D 25 01/21/2017 13* 30 - 100 ng/mL Final    Comment: Adult Ranges:   <20 ng/mL - Deficiency  20-29 ng/mL - Insufficiency   ng/mL - Sufficiency  The [employer] Vitamin D Assay is standardized to the  [facility] reference measurement procedures, a  reference method for the Vitamin D Standardization Program  (VDSP).  The VDSP aligns patient results among 25 ([place])  Vitamin D methods.               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 She was found to have DCIS. He had a close margin but reexcision was deferred by surgery. She underwent breast radiation. She has been on tamoxifen and has been tolerating it well. She has completed 5 year preventative tamoxifen therapy as of 12/2018. " She is doing well. Recent mammogram normal. Patient can now follow up with PCP with annual mammogram. No further oncology needs. Follow up in clinic as needed.    She agreed and verbalized  agreement and understanding with the current plan.  I answered all questions and concerns at this time           SIGNATURES:  Donta Steven    CC:  Lauren Fontenot M.D.  No ref. provider found

## 2021-01-14 DIAGNOSIS — Z23 NEED FOR VACCINATION: ICD-10-CM
